# Patient Record
Sex: FEMALE | Race: WHITE | NOT HISPANIC OR LATINO | Employment: FULL TIME | URBAN - NONMETROPOLITAN AREA
[De-identification: names, ages, dates, MRNs, and addresses within clinical notes are randomized per-mention and may not be internally consistent; named-entity substitution may affect disease eponyms.]

---

## 2024-03-19 RX ORDER — OXYBUTYNIN CHLORIDE 5 MG/1
5 TABLET ORAL
COMMUNITY
Start: 2023-10-10 | End: 2024-03-20

## 2024-03-19 RX ORDER — UMECLIDINIUM BROMIDE AND VILANTEROL TRIFENATATE 62.5; 25 UG/1; UG/1
1 POWDER RESPIRATORY (INHALATION)
COMMUNITY
End: 2024-03-20

## 2024-03-19 RX ORDER — PANTOPRAZOLE SODIUM 20 MG/1
20 TABLET, DELAYED RELEASE ORAL DAILY
COMMUNITY
End: 2024-03-20

## 2024-03-19 RX ORDER — PHENOL 1.4 %
1 AEROSOL, SPRAY (ML) MUCOUS MEMBRANE DAILY
COMMUNITY

## 2024-03-19 RX ORDER — TAMOXIFEN CITRATE 20 MG/1
20 TABLET ORAL DAILY
COMMUNITY
Start: 2023-10-10

## 2024-03-19 RX ORDER — ASPIRIN 81 MG/1
81 TABLET ORAL DAILY
COMMUNITY
Start: 2023-10-10

## 2024-03-20 ENCOUNTER — HOSPITAL ENCOUNTER (OUTPATIENT)
Dept: RADIOLOGY | Facility: HOSPITAL | Age: 49
Discharge: HOME | End: 2024-03-20
Payer: COMMERCIAL

## 2024-03-20 ENCOUNTER — HOSPITAL ENCOUNTER (OUTPATIENT)
Dept: RADIOLOGY | Facility: CLINIC | Age: 49
Discharge: HOME | End: 2024-03-20
Payer: COMMERCIAL

## 2024-03-20 ENCOUNTER — CONSULT (OUTPATIENT)
Dept: PAIN MEDICINE | Facility: CLINIC | Age: 49
End: 2024-03-20

## 2024-03-20 VITALS
HEART RATE: 101 BPM | SYSTOLIC BLOOD PRESSURE: 108 MMHG | HEIGHT: 64 IN | WEIGHT: 112 LBS | TEMPERATURE: 98.3 F | OXYGEN SATURATION: 97 % | DIASTOLIC BLOOD PRESSURE: 62 MMHG | BODY MASS INDEX: 19.12 KG/M2 | RESPIRATION RATE: 18 BRPM

## 2024-03-20 DIAGNOSIS — M47.812 CERVICAL SPONDYLOSIS: Primary | ICD-10-CM

## 2024-03-20 DIAGNOSIS — M47.812 CERVICAL SPONDYLOSIS: ICD-10-CM

## 2024-03-20 PROCEDURE — 99204 OFFICE O/P NEW MOD 45 MIN: CPT | Performed by: ANESTHESIOLOGY

## 2024-03-20 PROCEDURE — 72040 X-RAY EXAM NECK SPINE 2-3 VW: CPT

## 2024-03-20 PROCEDURE — 99407 BEHAV CHNG SMOKING > 10 MIN: CPT | Performed by: ANESTHESIOLOGY

## 2024-03-20 RX ORDER — ALBUTEROL SULFATE 90 UG/1
AEROSOL, METERED RESPIRATORY (INHALATION)
COMMUNITY
Start: 2024-03-18

## 2024-03-20 RX ORDER — AMOXICILLIN 500 MG/1
500 CAPSULE ORAL 3 TIMES DAILY
COMMUNITY
Start: 2024-03-14

## 2024-03-20 RX ORDER — NAPROXEN 500 MG/1
500 TABLET ORAL 2 TIMES DAILY WITH MEALS
Qty: 60 TABLET | Refills: 2 | Status: SHIPPED | OUTPATIENT
Start: 2024-03-20

## 2024-03-20 RX ORDER — PREDNISONE 10 MG/1
TABLET ORAL
COMMUNITY
Start: 2024-02-21 | End: 2024-03-20

## 2024-03-20 RX ORDER — PANTOPRAZOLE SODIUM 40 MG/1
40 TABLET, DELAYED RELEASE ORAL DAILY
COMMUNITY
Start: 2024-03-18

## 2024-03-20 RX ORDER — CYCLOBENZAPRINE HCL 10 MG
10 TABLET ORAL 3 TIMES DAILY
COMMUNITY
Start: 2024-02-22 | End: 2024-03-20

## 2024-03-20 RX ORDER — BUDESONIDE, GLYCOPYRROLATE, AND FORMOTEROL FUMARATE 160; 9; 4.8 UG/1; UG/1; UG/1
2 AEROSOL, METERED RESPIRATORY (INHALATION) 2 TIMES DAILY
COMMUNITY
Start: 2024-03-16

## 2024-03-20 NOTE — PATIENT INSTRUCTIONS
Neck Exercises   AMBULATORY CARE:   Neck exercises  help reduce neck pain, and improve neck movement and strength. Neck exercises also help prevent long-term neck problems.  Call your doctor if:   Your pain does not get better, or gets worse.    You have questions or concerns about your condition, care, or exercise program.    What you need to know about exercise safety:   Move slowly, gently, and smoothly.  Avoid fast or jerky motions.    Stand and sit the way your healthcare provider shows you.  Good posture may reduce your neck pain. Check your posture often, even when you are not doing your neck exercises.    Follow the exercise program recommended by your healthcare provider.  He or she will tell you which exercises are best for your condition. He or she will also tell you how many repetitions to do and how often you should do the exercises.    How to perform neck exercises safely:   Exercise position:  You may sit or stand while you do neck exercises. Face forward. Your shoulders should be straight and relaxed, with a good posture.         Head tilts, forward and back:  Gently bow your head and try to touch your chin to your chest. Your healthcare provider may tell you to push on the back of your neck to help bow your head. Raise your chin back to the starting position. Tilt your head back as far as possible so you are looking up at the ceiling. Your healthcare provider may tell you to lift your chin to help tilt your head back. Return your head to the starting position.         Head tilts, side to side:  Tilt your head, bringing your ear toward your shoulder. Then tilt your head toward the other shoulder.         Head turns:  Turn your head to look over your shoulder. Tilt your chin down and try to touch it to your shoulder. Do not raise your shoulder to your chin. Face forward again. Do the same on the other side.         Head rolls:  Slowly bring your chin toward your chest. Next, roll your head to the  right. Your ear should be positioned over your shoulder. Hold this position for 5 seconds. Roll your head back toward your chest and to the left into the same position. Hold for 5 seconds. Gently roll your head back and around in a clockwise [place] 3 times. Next, move your head in the reverse direction (counterclockwise) in a [place] 3 times. Do not shrug your shoulders upwards while you do this exercise.       Follow up with your doctor as directed:  Write down your questions so you remember to ask them during your visits.  © Copyright [employer] 2023 Information is for End User's use only and may not be sold, redistributed or otherwise used for commercial purposes.  The above information is an  only. It is not intended as medical advice for individual conditions or treatments. Talk to your doctor, nurse or pharmacist before following any medical regimen to see if it is safe and effective for you.

## 2024-03-20 NOTE — PROGRESS NOTES
Assessment  1. Cervical spondylosis    Right sided neck pain described primarily arthritic features. Achy, nagging, indolent crampy and throbbing pain worse with neck extension and lateral rotation, R>L. Distribution of pain follows the right C3-C6 medial branch nerve distributions. +facet loading maneuvers, ttp over right sided cervical paraspinal muscles. On xray minimal retrolisthesis of C5 on 6 and mild anterolisthesis of C2 on 3.  Multilevel mild discogenic degenerative with moderate to severe loss of disc height at C5-6. Reasonable at this time to trial medial branch blocks to target site of cervical facet mediated pain and pursue radiofrequency ablation if successful after PT.  Risks, benefits and alternatives of procedure in conjunction with multimodal pain therapy plan thoroughly discussed with patient.  Questions answered to patient's satisfaction. Lifestyle modifications extensively discussed including smoking cessation, calcium and Vit D intake, dexa scan planning, PT, neck posture during sleep, work.    The patient has been experiencing moderate to severe axial spine pain that is causing functional deficit.  The pain has been present for at least 3 months and is not improving with conservative care.  Currently the patient is not experiencing any radicular features nor neurogenic claudication.  Non-facet pathology has been ruled out on clinical evaluation.    Plan  -f/u in 6 weeks to consider MRI cervical spine, right sided cervical medial branch blocks  -xray cervical spine; will f/u result with patient  -Naproxen 500 mg b.i.d. prn pain prescribed.  Patient educated regarding bleeding risk of taking this medication as well as not taking any other nonsteroidal anti-inflammatory medications while taking this medication; counseled thoroughly regarding potential risk of Cardiovascular injury, Kidney injury, Gastrointestinal ulceration/bleeding. Patient voiced understanding  -script provided for formal  physical therapy for right-sided cervical spondylosis; Physician directed home exercise plan as per [employer] demonstrated and handouts provided that patient plans to participate with for 1 hour, twice a week for the next 6 weeks.     There are risks associated with opioid medications, including dependence, addiction and tolerance. The patient understands and agrees to use these medications only as prescribed. Potential side effects of the medications include, but are not limited to, constipation, drowsiness, addiction, impaired judgment and risk of fatal overdose if not taken as prescribed. The patient was warned against driving while taking sedation medications or operating heavy machinery. The patient voiced understanding. Sharing medications is a felony. At this point in time, the patient is showing no signs of addiction, abuse, diversion or suicidal ideation.     [place] Prescription Drug Monitoring Program report was reviewed and was appropriate      Complete risks and benefits including bleeding, infection, tissue reaction, nerve injury and allergic reaction were discussed. The approach was demonstrated using models and literature was provided. Verbal and written consent was obtained.     My impressions and treatment recommendations were discussed in detail with the patient who verbalized understanding and had no further questions.  Discharge instructions were provided. I personally saw and examined the patient and I agree with the above discussed plan of care.    New Medications Ordered This Visit   Medications    Multiple Vitamins-Minerals (Multivitamin Women) TABS     Sig: Take 1 capsule by mouth daily    aspirin (ECOTRIN LOW STRENGTH) 81 mg EC tablet     Sig: Take 81 mg by mouth daily    tamoxifen (NOLVADEX) 20 mg tablet     Sig: Take 20 mg by mouth daily    amoxicillin (AMOXIL) 500 mg capsule     Sig: Take 500 mg by mouth 3 (three) times a day    Breztri Aerosphere 160-9-4.8 MCG/ACT AERO     Si puffs  2 (two) times a day    albuterol (PROVENTIL HFA,VENTOLIN HFA) 90 mcg/act inhaler     Sig: INHALE TWO PUFFS BY MOUTH EVERY 4 TO 6 HOURS AS NEEDED    pantoprazole (PROTONIX) 40 mg tablet     Sig: Take 40 mg by mouth daily       History of Present Illness    Meme Hoover is a 49 y.o. female with pmhx of COPD (1/2 PPD smoker), GERD, hx of cervical spinal stenosis presenting with progressive neck pain described primarily as arthritic in nature.  The patient describes predominantly aching, nagging, indolent crampy, stabbing axial neck pain without radicular features or pain limited weakness numbness and paresthesias.  The pain is 8/10 nature and is worse with overhead maneuvers as well as lateral rotation and extension of the right neck.  The patient has been to physical therapy in the past but not recently. She has failed conservative medical management including nsaids, tylenol and muscle relaxers. The pain is significant source of disability and compromises independent activities of daily living as well as overall function.  The patient has never trialed cervical epidural steroid injections or facet blocks/medial branch blocks. She denies any saddle anesthesia, gait abnormality or bowel/baldder abnormality; She notes headaches at times extending to the right occiput occassionally.    I have personally reviewed and/or updated the patient's past medical history, past surgical history, family history, social history, current medications, allergies, and vital signs today.     Review of Systems   Constitutional:  Positive for activity change.   HENT: Negative.     Eyes: Negative.    Respiratory: Negative.     Cardiovascular: Negative.    Gastrointestinal: Negative.    Endocrine: Negative.    Genitourinary: Negative.    Musculoskeletal:  Positive for arthralgias, myalgias, neck pain and neck stiffness. Negative for back pain and gait problem.   Skin: Negative.    Allergic/Immunologic: Negative.    Neurological:  Negative  for weakness and numbness.   Hematological: Negative.    Psychiatric/Behavioral: Negative.     All other systems reviewed and are negative.      There is no problem list on file for this patient.      History reviewed. No pertinent past medical history.    Past Surgical History:   Procedure Laterality Date    BREAST LUMPECTOMY  01/2021    DILATION AND CURETTAGE OF UTERUS         History reviewed. No pertinent family history.    Social History     Occupational History    Not on file   Tobacco Use    Smoking status: Every Day     Current packs/day: 0.50     Types: Cigarettes    Smokeless tobacco: Never   Vaping Use    Vaping status: Never Used   Substance and Sexual Activity    Alcohol use: Yes     Comment: occasionally    Drug use: Not Currently    Sexual activity: Not on file       Current Outpatient Medications on File Prior to Visit   Medication Sig    albuterol (PROVENTIL HFA,VENTOLIN HFA) 90 mcg/act inhaler INHALE TWO PUFFS BY MOUTH EVERY 4 TO 6 HOURS AS NEEDED    amoxicillin (AMOXIL) 500 mg capsule Take 500 mg by mouth 3 (three) times a day    aspirin (ECOTRIN LOW STRENGTH) 81 mg EC tablet Take 81 mg by mouth daily    Breztri Aerosphere 160-9-4.8 MCG/ACT AERO 2 puffs 2 (two) times a day    Multiple Vitamins-Minerals (Multivitamin Women) TABS Take 1 capsule by mouth daily    pantoprazole (PROTONIX) 40 mg tablet Take 40 mg by mouth daily    tamoxifen (NOLVADEX) 20 mg tablet Take 20 mg by mouth daily    [DISCONTINUED] cyclobenzaprine (FLEXERIL) 10 mg tablet Take 10 mg by mouth 3 (three) times a day PRN    [DISCONTINUED] Albuterol Sulfate 108 (90 Base) MCG/ACT AEPB Inhale (Patient not taking: Reported on 3/20/2024)    [DISCONTINUED] mupirocin (BACTROBAN) 2 % nasal ointment Apply topically Three times a day (Patient not taking: Reported on 3/20/2024)    [DISCONTINUED] oxybutynin (DITROPAN) 5 mg tablet Take 5 mg by mouth (Patient not taking: Reported on 3/20/2024)    [DISCONTINUED] pantoprazole (PROTONIX) 20 mg  "tablet Take 20 mg by mouth daily (Patient not taking: Reported on 3/20/2024)    [DISCONTINUED] predniSONE 10 mg tablet TAKE 4 TABS DAILY WITH FOOD FOR 3 DAYS, THEN 3 TABS DAILY FOR 3 DAYS, THEN 2 TABS DAILY FOR 3 DAYS, THEN 1 TAB DAILY FOR 3 DAYS. (Patient not taking: Reported on 3/20/2024)    [DISCONTINUED] umeclidinium-vilanterol (Anoro Ellipta) 62.5-25 mcg/actuation inhaler Inhale 1 puff (Patient not taking: Reported on 3/20/2024)     No current facility-administered medications on file prior to visit.       Not on File      Physical Exam    /62 (BP Location: Left arm, Patient Position: Sitting, Cuff Size: Adult)   Pulse 101   Temp 98.3 °F (36.8 °C)   Resp 18   Ht 5' 4\" (1.626 m)   Wt 50.8 kg (112 lb)   SpO2 97%   BMI 19.22 kg/m²     Constitutional: normal, well developed, well nourished, alert, in no distress and non-toxic and no overt pain behavior.  Eyes: anicteric  HEENT: grossly intact  Neck: supple, symmetric, trachea midline and no masses   Pulmonary:even and unlabored  Cardiovascular:No edema or pitting edema present  Skin:Normal without rashes or lesions and well hydrated  Psychiatric:Mood and affect appropriate  Neurologic:Cranial Nerves II-XII grossly intact Sensation grossly intact; no clonus negative garcia's. Reflexes 2+ and brisk. Spurling's maneuver negative bilaterally.  Musculoskeletal:normal gait. 5/5 strength bilaterally with AROM in upper extremities.Significant pain with cervical facet loading bilaterally and with lateral spine rotation, ttp over cervical paraspinal muscles, right greater than left.    Imaging    CERVICAL SPINE     INDICATION:   Spondylosis without myelopathy or radiculopathy, cervical region.      COMPARISON: MRI dated 10/20/2020     VIEWS:  XR SPINE CERVICAL 2 OR 3 VW INJURY      FINDINGS:     No acute fracture or subluxation.      Minimal retrolisthesis of C5 on 6 and mild anterolisthesis of C2 on 3     Multilevel mild discogenic degenerative with moderate " to severe loss of disc height at C5-6     Normal prevertebral soft tissues.       Clear lung apices.     IMPRESSION:        No acute osseous abnormality.     Degenerative changes as above.

## 2024-03-21 ENCOUNTER — TELEPHONE (OUTPATIENT)
Dept: PAIN MEDICINE | Facility: CLINIC | Age: 49
End: 2024-03-21

## 2024-03-21 NOTE — TELEPHONE ENCOUNTER
----- Message from Everardo Boyce MD sent at 3/20/2024  6:08 PM EDT -----  Minimal retrolisthesis of C5 on 6 and mild anterolisthesis of C2 on 3     Multilevel mild discogenic degenerative with moderate to severe loss of disc height at C5-6    Please relay findings of cervical spine xray to patient; will call with MRI results thanks  ----- Message -----  From: Interface, Radiology Results In  Sent: 3/20/2024   3:42 PM EDT  To: Everardo Boyce MD

## 2024-03-21 NOTE — TELEPHONE ENCOUNTER
Caller: Meme     Doctor: dr bejarano    Reason for call: pt returning nurses phone call     Call back#: [phone]

## 2024-03-25 NOTE — TELEPHONE ENCOUNTER
Caller: belinda Valentine    Doctor: Carli    Reason for call: pt called asking since there is no disc at c5 - c6 should she still be doing PT?    Call back#: [phone]

## 2024-03-25 NOTE — TELEPHONE ENCOUNTER
S/W pt and advised of the same, pt verbalized understanding and appreciative of call.     Everardo Boyce MD  You2 minutes ago (3:52 PM)       There is disc just moderate to severe degeneration and no instability. PT still ok to improve strength, ROM and flexibility

## 2024-05-01 ENCOUNTER — OFFICE VISIT (OUTPATIENT)
Dept: PAIN MEDICINE | Facility: CLINIC | Age: 49
End: 2024-05-01
Payer: COMMERCIAL

## 2024-05-01 VITALS
DIASTOLIC BLOOD PRESSURE: 68 MMHG | SYSTOLIC BLOOD PRESSURE: 112 MMHG | WEIGHT: 114 LBS | RESPIRATION RATE: 18 BRPM | TEMPERATURE: 98.5 F | BODY MASS INDEX: 19.46 KG/M2 | OXYGEN SATURATION: 98 % | HEART RATE: 85 BPM | HEIGHT: 64 IN

## 2024-05-01 DIAGNOSIS — M47.812 CERVICAL SPONDYLOSIS: Primary | ICD-10-CM

## 2024-05-01 PROCEDURE — 99214 OFFICE O/P EST MOD 30 MIN: CPT | Performed by: ANESTHESIOLOGY

## 2024-05-01 RX ORDER — NAPROXEN 500 MG/1
500 TABLET ORAL 2 TIMES DAILY WITH MEALS
Qty: 60 TABLET | Refills: 2 | Status: SHIPPED | OUTPATIENT
Start: 2024-05-01

## 2024-05-01 NOTE — PATIENT INSTRUCTIONS
Naproxen (By mouth)   Naproxen (na-PROX-en)  Treats fever and pain. Also treats arthritis, gout, and menstrual cramps or pain. This medicine is an NSAID.   Brand Name(s): Aleve, Aleve Arthritis, All Day Pain Relief, All Day Relief, Anaprox DS, EC Naprosyn, Flanax Pain Relief, [facility] All Day Pain Relief, [facility] Naproxen Sodium, [facility] All Day Pain Relief, [facility] Naproxen Sodium, [facility] All Day Pain Relief, Mediproxen, Naprelan, Naprosyn   There may be other brand names for this medicine.  When This Medicine Should Not Be Used:   This medicine is not right for everyone. Do not use it if you had an allergic reaction to naproxen, aspirin, or other NSAIDs. Do not use it if right before or after a heart surgery called coronary artery bypass graft (CABG).  How to Use This Medicine:   Liquid Filled Capsule, Liquid, Tablet, Coated Tablet, Long Acting Tablet  Your doctor will tell you how much medicine to use. Do not use more than directed.  Take this medicine with food or milk so it does not upset your stomach. Drink a full glass of water after each dose.  Delayed-release tablet: Swallow whole. Do not crush, break, or chew it.  Oral liquid: Shake well just before you measure the dose. Measure the oral liquid medicine with a marked measuring spoon, oral syringe, or medicine cup.  Follow the instructions on the medicine label if you are using this medicine without a prescription.  This medicine should come with a Medication Guide. Ask your pharmacist for a copy if you do not have one.  Missed dose: Take a dose as soon as you remember. If it is almost time for your next dose, wait until then and take a regular dose. Do not take extra medicine to make up for a missed dose.  Store the medicine in a closed container at room temperature, away from heat, moisture, and direct light. Oral liquid: Do not freeze.  Drugs and Foods to Avoid:   Ask your doctor or pharmacist before using any other  medicine, including over-the-counter medicines, vitamins, and herbal products.  Do not use aspirin or any other NSAID medicine (including celecoxib, diclofenac, diflunisal, ibuprofen, salsalate) unless your doctor says it is okay.  Some medicines can affect how naproxen works. Tell your doctor if you are using any of the following:  Cholestyramine, cyclosporine, digoxin, lithium, methotrexate, probenecid, sucralfate  Blood pressure medicine (including ACE inhibitors, ARBs, beta blockers)  Blood thinner (including warfarin)  Diuretic (water pill)  Medicine to treat depression (including SNRIs, SSRIs)  Steroid medicine  Stomach medicine (including antacids)  Do not drink alcohol while you are using this medicine.  Warnings While Using This Medicine:   Tell your doctor if you are pregnant or breastfeeding. Do not use this medicine during the later part of a pregnancy, unless your doctor tells you to.  Tell your doctor if you have kidney disease, liver disease, anemia, asthma, bleeding problems, high blood pressure, heart failure, a recent heart attack, or a history of stomach or bowel problems (including ulcers or bleeding). Tell your doctor if you smoke or drink alcohol.  This medicine may cause the following problems:  Increased risk of blood clots, heart attack, stroke, or heart failure  Stomach or bowel problems (including bleeding, ulcers, or perforation)  Liver problems  Kidney problems  High blood pressure  Serious skin reactions, including exfoliative dermatitis, Johnson-Augustin syndrome, toxic epidermal necrolysis, and drug reaction with eosinophilia and systemic symptoms (DRESS)  Call your doctor if symptoms get worse, pain lasts more than 10 days, or fever lasts more than 3 days.  Tell any doctor or dentist who treats that you are using this medicine, especially if you have surgery or a procedure.  This medicine may make you dizzy or drowsy. Do not drive or do anything else that could be dangerous until you  know how this medicine affects you.  This medicine may cause a delay in ovulation for women and may affect their ability to have children. If you plan to have children, talk with your doctor before using this medicine.  Tell any doctor or dentist who treats you that you are using this medicine. This medicine may affect certain medical test results.  Your doctor will do lab tests at regular visits to check on the effects of this medicine. Keep all appointments.  Keep all medicine out of the reach of children. Never share your medicine with anyone.  Possible Side Effects While Using This Medicine:   Call your doctor right away if you notice any of these side effects:  Allergic reaction: Itching or hives, swelling in your face or hands, swelling or tingling in your mouth or throat, chest tightness, trouble breathing  Blistering, peeling, red skin rash  Bloody or black, tarry stools, severe stomach pain, vomiting blood or something that looks like coffee grounds  Change in how much or how often you urinate, painful or difficult urination  Chest pain that may spread, trouble breathing, unusual sweating, fainting  Dark urine or pale stools, nausea, vomiting, loss of appetite, stomach pain, yellow skin or eyes  Fever, chills, cough, sore throat, body aches  Numbness or weakness on one side of your body, sudden or severe headache, problems with vision, speech, or walking  Rapid weight gain, swelling in your hands, ankles, or feet  Unusual bleeding, bruising, or weakness  Vision changes  If you notice these less serious side effects, talk with your doctor:   Mild nausea, diarrhea, constipation, stomach upset  Ringing in your ears, dizziness, headache  If you notice other side effects that you think are caused by this medicine, tell your doctor.   Call your doctor for medical advice about side effects. You may report side effects to FDA at 0-415-FDA-3947  © Copyright [employer] 2023 Information is for End User's use only and  may not be sold, redistributed or otherwise used for commercial purposes.  The above information is an  only. It is not intended as medical advice for individual conditions or treatments. Talk to your doctor, nurse or pharmacist before following any medical regimen to see if it is safe and effective for you.  Neck Exercises   AMBULATORY CARE:   Neck exercises  help reduce neck pain, and improve neck movement and strength. Neck exercises also help prevent long-term neck problems.  Call your doctor if:   Your pain does not get better, or gets worse.    You have questions or concerns about your condition, care, or exercise program.    What you need to know about exercise safety:   Move slowly, gently, and smoothly.  Avoid fast or jerky motions.    Stand and sit the way your healthcare provider shows you.  Good posture may reduce your neck pain. Check your posture often, even when you are not doing your neck exercises.    Follow the exercise program recommended by your healthcare provider.  He or she will tell you which exercises are best for your condition. He or she will also tell you how many repetitions to do and how often you should do the exercises.    How to perform neck exercises safely:   Exercise position:  You may sit or stand while you do neck exercises. Face forward. Your shoulders should be straight and relaxed, with a good posture.         Head tilts, forward and back:  Gently bow your head and try to touch your chin to your chest. Your healthcare provider may tell you to push on the back of your neck to help bow your head. Raise your chin back to the starting position. Tilt your head back as far as possible so you are looking up at the ceiling. Your healthcare provider may tell you to lift your chin to help tilt your head back. Return your head to the starting position.         Head tilts, side to side:  Tilt your head, bringing your ear toward your shoulder. Then tilt your head toward the  other shoulder.         Head turns:  Turn your head to look over your shoulder. Tilt your chin down and try to touch it to your shoulder. Do not raise your shoulder to your chin. Face forward again. Do the same on the other side.         Head rolls:  Slowly bring your chin toward your chest. Next, roll your head to the right. Your ear should be positioned over your shoulder. Hold this position for 5 seconds. Roll your head back toward your chest and to the left into the same position. Hold for 5 seconds. Gently roll your head back and around in a clockwise [place] 3 times. Next, move your head in the reverse direction (counterclockwise) in a [place] 3 times. Do not shrug your shoulders upwards while you do this exercise.       Follow up with your doctor as directed:  Write down your questions so you remember to ask them during your visits.  © Copyright [employer] 2023 Information is for End User's use only and may not be sold, redistributed or otherwise used for commercial purposes.  The above information is an  only. It is not intended as medical advice for individual conditions or treatments. Talk to your doctor, nurse or pharmacist before following any medical regimen to see if it is safe and effective for you.

## 2024-05-01 NOTE — PROGRESS NOTES
Assessment  1. Cervical spondylosis - Right    Right sided neck pain described primarily arthritic features. Achy, nagging, indolent crampy and throbbing pain worse with neck extension and lateral rotation, R>L. Distribution of pain follows the right C3-C6 medial branch nerve distributions. +facet loading maneuvers, ttp over right sided cervical paraspinal muscles. On xray minimal retrolisthesis of C5 on 6 and mild anterolisthesis of C2 on 3.  Multilevel mild discogenic degenerative with moderate to severe loss of disc height at C5-6. Reasonable at this time to trial medial branch blocks to target site of cervical facet mediated pain and pursue radiofrequency ablation if successful after PT.  Risks, benefits and alternatives of procedure in conjunction with multimodal pain therapy plan thoroughly discussed with patient.  Questions answered to patient's satisfaction. Lifestyle modifications extensively discussed including smoking cessation, calcium and Vit D intake, dexa scan planning, PT, neck posture during sleep, work.    The patient has been experiencing moderate to severe axial spine pain that is causing functional deficit.  The pain has been present for at least 3 months and is not improving with conservative care.  Currently the patient is not experiencing any radicular features nor neurogenic claudication.  Non-facet pathology has been ruled out on clinical evaluation.    Plan  -f/u prn to consider MRI cervical spine, right sided cervical medial branch blocks  -Naproxen 500 mg b.i.d. prn pain prescribed.  Patient educated regarding bleeding risk of taking this medication as well as not taking any other nonsteroidal anti-inflammatory medications while taking this medication; counseled thoroughly regarding potential risk of Cardiovascular injury, Kidney injury, Gastrointestinal ulceration/bleeding. Patient voiced understanding  -script provided for formal physical therapy for right-sided cervical  spondylosis; Physician directed home exercise plan as per [employer] demonstrated and handouts provided that patient plans to participate with for 1 hour, twice a week for the next 6 weeks.     There are risks associated with opioid medications, including dependence, addiction and tolerance. The patient understands and agrees to use these medications only as prescribed. Potential side effects of the medications include, but are not limited to, constipation, drowsiness, addiction, impaired judgment and risk of fatal overdose if not taken as prescribed. The patient was warned against driving while taking sedation medications or operating heavy machinery. The patient voiced understanding. Sharing medications is a felony. At this point in time, the patient is showing no signs of addiction, abuse, diversion or suicidal ideation.     [place] Prescription Drug Monitoring Program report was reviewed and was appropriate      Complete risks and benefits including bleeding, infection, tissue reaction, nerve injury and allergic reaction were discussed. The approach was demonstrated using models and literature was provided. Verbal and written consent was obtained.     My impressions and treatment recommendations were discussed in detail with the patient who verbalized understanding and had no further questions.  Discharge instructions were provided. I personally saw and examined the patient and I agree with the above discussed plan of care.    No orders of the defined types were placed in this encounter.      History of Present Illness    Meme Hoover is a 49 y.o. female with pmhx of COPD (1/2 PPD smoker), GERD, hx of cervical spinal stenosis presenting with progressive neck pain described primarily as arthritic in nature.  The patient describes predominantly aching, nagging, indolent crampy, stabbing axial neck pain without radicular features or pain limited weakness numbness and paresthesias.  The pain is 8/10 nature and is  worse with overhead maneuvers as well as lateral rotation and extension of the right neck.  The patient has been to physical therapy in the past but not recently. She has failed conservative medical management including nsaids, tylenol and muscle relaxers. The pain is significant source of disability and compromises independent activities of daily living as well as overall function.  The patient has never trialed cervical epidural steroid injections or facet blocks/medial branch blocks. She denies any saddle anesthesia, gait abnormality or bowel/baldder abnormality; She notes headaches at times extending to the right occiput occassionally.    I have personally reviewed and/or updated the patient's past medical history, past surgical history, family history, social history, current medications, allergies, and vital signs today.     Review of Systems   Constitutional:  Positive for activity change.   HENT: Negative.     Eyes: Negative.    Respiratory: Negative.     Cardiovascular: Negative.    Gastrointestinal: Negative.    Endocrine: Negative.    Genitourinary: Negative.    Musculoskeletal:  Positive for arthralgias, myalgias, neck pain and neck stiffness. Negative for back pain and gait problem.   Skin: Negative.    Allergic/Immunologic: Negative.    Neurological:  Negative for weakness and numbness.   Hematological: Negative.    Psychiatric/Behavioral: Negative.     All other systems reviewed and are negative.      Patient Active Problem List   Diagnosis    Cervical spondylosis - Right       History reviewed. No pertinent past medical history.    Past Surgical History:   Procedure Laterality Date    BREAST LUMPECTOMY  01/2021    DILATION AND CURETTAGE OF UTERUS         History reviewed. No pertinent family history.    Social History     Occupational History    Not on file   Tobacco Use    Smoking status: Every Day     Current packs/day: 0.50     Types: Cigarettes    Smokeless tobacco: Never   Vaping Use    Vaping  "status: Never Used   Substance and Sexual Activity    Alcohol use: Yes     Comment: occasionally    Drug use: Not Currently    Sexual activity: Not on file       Current Outpatient Medications on File Prior to Visit   Medication Sig    albuterol (PROVENTIL HFA,VENTOLIN HFA) 90 mcg/act inhaler INHALE TWO PUFFS BY MOUTH EVERY 4 TO 6 HOURS AS NEEDED    aspirin (ECOTRIN LOW STRENGTH) 81 mg EC tablet Take 81 mg by mouth daily    Breztri Aerosphere 160-9-4.8 MCG/ACT AERO 2 puffs 2 (two) times a day    Multiple Vitamins-Minerals (Multivitamin Women) TABS Take 1 capsule by mouth daily    naproxen (NAPROSYN) 500 mg tablet Take 1 tablet (500 mg total) by mouth 2 (two) times a day with meals    pantoprazole (PROTONIX) 40 mg tablet Take 40 mg by mouth daily    tamoxifen (NOLVADEX) 20 mg tablet Take 20 mg by mouth daily    amoxicillin (AMOXIL) 500 mg capsule Take 500 mg by mouth 3 (three) times a day (Patient not taking: Reported on 5/1/2024)     No current facility-administered medications on file prior to visit.       No Known Allergies      Physical Exam    /68 (BP Location: Left arm, Patient Position: Sitting, Cuff Size: Adult)   Pulse 85   Temp 98.5 °F (36.9 °C)   Resp 18   Ht 5' 4\" (1.626 m)   Wt 51.7 kg (114 lb)   SpO2 98%   BMI 19.57 kg/m²     Constitutional: normal, well developed, well nourished, alert, in no distress and non-toxic and no overt pain behavior.  Eyes: anicteric  HEENT: grossly intact  Neck: supple, symmetric, trachea midline and no masses   Pulmonary:even and unlabored  Cardiovascular:No edema or pitting edema present  Skin:Normal without rashes or lesions and well hydrated  Psychiatric:Mood and affect appropriate  Neurologic:Cranial Nerves II-XII grossly intact Sensation grossly intact; no clonus negative garcia's. Reflexes 2+ and brisk. Spurling's maneuver negative bilaterally.  Musculoskeletal:normal gait. 5/5 strength bilaterally with AROM in upper extremities.Significant pain with " cervical facet loading bilaterally and with lateral spine rotation, ttp over cervical paraspinal muscles, right greater than left.    Imaging    CERVICAL SPINE     INDICATION:   Spondylosis without myelopathy or radiculopathy, cervical region.      COMPARISON: MRI dated 10/20/2020     VIEWS:  XR SPINE CERVICAL 2 OR 3 VW INJURY      FINDINGS:     No acute fracture or subluxation.      Minimal retrolisthesis of C5 on 6 and mild anterolisthesis of C2 on 3     Multilevel mild discogenic degenerative with moderate to severe loss of disc height at C5-6     Normal prevertebral soft tissues.       Clear lung apices.     IMPRESSION:        No acute osseous abnormality.     Degenerative changes as above.